# Patient Record
Sex: FEMALE | Race: WHITE | NOT HISPANIC OR LATINO
[De-identification: names, ages, dates, MRNs, and addresses within clinical notes are randomized per-mention and may not be internally consistent; named-entity substitution may affect disease eponyms.]

---

## 2022-09-29 ENCOUNTER — APPOINTMENT (OUTPATIENT)
Dept: ORTHOPEDIC SURGERY | Facility: CLINIC | Age: 62
End: 2022-09-29

## 2022-11-30 ENCOUNTER — APPOINTMENT (OUTPATIENT)
Dept: ULTRASOUND IMAGING | Facility: CLINIC | Age: 62
End: 2022-11-30

## 2022-11-30 ENCOUNTER — OUTPATIENT (OUTPATIENT)
Dept: OUTPATIENT SERVICES | Facility: HOSPITAL | Age: 62
LOS: 1 days | End: 2022-11-30

## 2022-11-30 PROCEDURE — 76881 US COMPL JOINT R-T W/IMG: CPT | Mod: 26,RT

## 2022-12-14 ENCOUNTER — TRANSCRIPTION ENCOUNTER (OUTPATIENT)
Age: 62
End: 2022-12-14

## 2023-05-18 ENCOUNTER — APPOINTMENT (OUTPATIENT)
Dept: ORTHOPEDIC SURGERY | Facility: CLINIC | Age: 63
End: 2023-05-18
Payer: MEDICAID

## 2023-05-18 ENCOUNTER — NON-APPOINTMENT (OUTPATIENT)
Age: 63
End: 2023-05-18

## 2023-05-18 DIAGNOSIS — M54.2 CERVICALGIA: ICD-10-CM

## 2023-05-18 PROCEDURE — 99204 OFFICE O/P NEW MOD 45 MIN: CPT

## 2023-05-20 PROBLEM — M54.2 NECK PAIN: Status: ACTIVE | Noted: 2023-05-18

## 2023-05-20 NOTE — PHYSICAL EXAM
[de-identified] : General: No acute distress, conversant, well-nourished.\par Head: Normocephalic, atraumatic\par Neck: trachea midline, FROM\par Heart: normotensive and normal rate and rhythm\par Lungs: No labored breathing\par Skin: No abrasions, no rashes, no edema\par Psych: Alert and oriented to person, place and time\par Extremities: no peripheral edema or digital cyanosis\par Gait: Normal gait. Can perform tandem gait.  \par Vascular: warm and well perfused distally, palpable distal pulses\par \par MSK:\par Spine: \par incisions well healed\par \par NEURO:\par Sensation \par          Left           \par C5     2/2               \par C6     2/2               \par C7     2/2               \par C8     2/2              \par T1     2/2             \par \par          Right         \par C5     2/2               \par C6     2/2               \par C7     2/2               \par C8     2/2              \par T1     2/2      \par \par Motor: \par                                                Left             \par C5 (deltoid abduction)             5/5               \par C6 (biceps flexion)                   5/5                \par C7 (triceps extension)             5/5               \par C8 (finger flexion)                     5/5               \par T1 (interosseous)                     5/5           \par \par                                                Right           \par C5 (deltoid abduction)             5/5               \par C6 (biceps flexion)                   5/5                \par C7 (triceps extension)             5/5               \par C8 (finger flexion)                     5/5               \par T1 (interosseous)                     5/5                     \par \par Sensation \par Left L2  -  2/2            \par Left L3  -  2/2\par Left L4  -  2/2\par Left L5  -  2/2\par Left S1  -  2/2\par \par Right L2  -  2/2            \par Right L3  -  2/2\par Right L4  -  2/2\par Right L5  -  2/2\par Right S1  -  2/2\par \par Motor: \par Left L2 (hip flexion)                            5/5                \par Left L3 (knee extension)                   5/5                \par Left L4 (ankle dorsiflexion)                 5/5                \par Left L5 (long toe extensor)                5/5                \par Left S1 (ankle plantar flexion)           5/5\par \par Right L2 (hip flexion)                            5/5                \par Right L3 (knee extension)                   5/5                \par Right L4 (ankle dorsiflexion)                 5/5                \par Right L5 (long toe extensor)                5/5                \par Right S1 (ankle plantar flexion)           5/5\par \par Reflexes: Normal and symmetric\par Negative Spurling’s test.  Negative Aden’s reflex.  \par Negative clonus.  Down-going Babinski. [de-identified] : Patient declined new radiographs\par She reports having recent imaging but does not have the images for review.\par She has incomplete printout of imaging reports.

## 2023-05-20 NOTE — ASSESSMENT
[FreeTextEntry1] : 62 year old female presents with acute exacerbation of chronic neck pain.  She has had multiple cervical surgeries.  Her first surgery was a posterior cervical decompression and fusion in 2020.  She required two revision surgeries in 2022 for possible hardware failure and nonunion. She smoke tobacco.  She was told she needs another revision.  She is here for a second opinion. She will followup with her recent images on CD and recent radiology reports. She was counseled on tobacco smoking cessation. She will be sent for a DEXA scan. We discussed red flag symptoms that would require emergent evaluation. She knows to call with any questions or concerns or if her symptoms acutely worsen.

## 2023-05-20 NOTE — HISTORY OF PRESENT ILLNESS
[de-identified] : 62 year old female presents with acute exacerbation of chronic neck pain.  She has had multiple cervical surgeries.  Her first surgery was a posterior cervical decompression and fusion in 2020.  She required two revision surgeries in 2022 for possible hardware failure and nonunion. She smoke tobacco.  She was told she needs another revision.  She is here for a second opinion. She does not have her recent imaging. She reports continued neck pain.

## 2024-04-24 ENCOUNTER — APPOINTMENT (OUTPATIENT)
Dept: ORTHOPEDIC SURGERY | Facility: CLINIC | Age: 64
End: 2024-04-24
Payer: MEDICAID

## 2024-04-24 VITALS — HEIGHT: 61 IN | WEIGHT: 99 LBS | BODY MASS INDEX: 18.69 KG/M2

## 2024-04-24 PROCEDURE — 99204 OFFICE O/P NEW MOD 45 MIN: CPT

## 2024-04-24 NOTE — PHYSICAL EXAM
[de-identified] : PHYSICAL EXAM  RIGHT  SHOULDER   NORMAL POSTURE / SCAPULAR PROTRACTION AROM 140 / 140 / 90 / 30 TENDER: SA REGION LATERAL  SPECIAL TESTING : ARGUETA - POSITIVE  NIK - POSITIVE  SPEED TEST - POSITIVE  FALLON - NEGATIVE  APPREHENSION AND SUPPRESSION - NEGATIVE   RC STRENGTH TESTING  SS:  4/5 SUB 5/5 IS     5/5 BICEPS  5/5  SENSATION  - GROSSLY INTACT    [de-identified] : Date of Exam: 03-   EXAM:  MRI RIGHT SHOULDER WITHOUT CONTRAST   IMPRESSION:  MRI of the right shoulder demonstrates:  1.  Severe supraspinatus and infraspinatus tendinosis with superimposed complex high-grade to full-thickness tearing at the junctional fibers with delamination continuation into the myotendinous junctions as detailed above. 2.  Mild subscapularis tendinosis with low-grade interstitial and articular surface fraying at the footprint. 3.  No significant fatty muscle atrophy or muscle edema. 4.  Glenohumeral joint osteoarthrosis with degenerative tearing of the superior labrum. 5.  Small to moderate joint effusion with synovial hypertrophy/debris. 6.  Moderate to severe acromioclavicular joint osteoarthrosis.

## 2024-04-24 NOTE — HISTORY OF PRESENT ILLNESS
[de-identified] : LOCATION: RIGHT SHOULDE RPAIN- RHD DURATION: 2020- PATIENT FELL BACKWARDS- SHOULDER PAIN AFTER FRACTURE OF NECK  ( TREATED WITH FUSION ) RADIATING PAIN UP NECK AND DOWN ARM  CONSTANT PAIN LEVEL:10/10  APRIL 18, 2024- PT HAD 3 CORTISONE INJ-STATED LAST INJ WAS APRIL 18 TREATMENTS: REST, TRAMDAOL, MUSCLE RELAXER  AGGRAVATING FACTORS: OVER HEAD LIFTING, REACHING BEHIND  PT WENT TO PHYSCIAL THERAPY 20 SEESIONS  PRIOR STUDIES:

## 2024-04-24 NOTE — DISCUSSION/SUMMARY
[de-identified] : PATIENT HAS COMPLETENEAR COMPLETE 15 MM ROTATOR CUFF TEAR ON MRI HAS HAD MULTIPLE CORTICOSTEROID INJECTIONS WITHOUT RELIEF HAS HAD EXTENDED PHYSICAL THERAPY MORE THAN 20 SESSIONS WITHOUT RELIEF.  I HAVE OFFERED PATIENT ARTHROSCOPIC TREATMENT ROTATOR CUFF REPAIR ACROMION PLASTY DECOMPRESSION.  SHE UNDERSTANDS 4 TO 6 WEEKS 6 WEEKS IMMOBILIZATION SLING 12 TO 24 WEEKS PHYSICAL THERAPY  ON AVERAGE 80 TO 85% IMPROVEMENT BY 1 YEAR % OUTCOMES PROMISED   PREOP SHOULDER SURGERY DISCUSSION:  PROCEDURE DISCUSSED - QUESTIONS ANSWERED PATIENT WISHES TO PROCEED  POST OP CARE AND LIMITATIONS REVIEWED - HANDOUT PROVIDED   COLD PACKS RECOMMENDED ANALGESICS AND  ANTI NAUSEA MEDS PRESCRIBED  COLACE RECOMMENDED   THERE ARE NO GUARANTEES THAT ALL SYMPTOMS WILL BE ALLEVIATED   SHOULDER ARTHROSCOPY, ACROMIOPLASTY, DEBRIDEMENT,  RC REPAIR AND LABRUM REPAIRS- ON AVERAGE 75- 85% SATISFACTORY RESULTS FOR TEARS < 3CM AFTER 9-12 MONTHS HEALING AND REHABILITATION.   REPAIRS WILL REQUIRE STRICT SHOULDER IMMOBILIZER 4-6 WEEKS  RC TEARS 3CM OR LARGER MAY REQUIRE COLLAGEN PATCH AUGMENTATION GENERALLY HAVE LESS SATISFACTORY RESULTS  PHYSICAL THERAPY REQUIRED 2X WEEK FOR  MINIMUM 8-12 WEEKS FOR ALL PROCEDURES  CONTINUED HOME EXERCISES 6-9 MONTHS AFTER THAT REQUIRED FOR OPTIMAL OUTCOMES   ROUTINE SURGICAL AND ANESTHETIC RISKS INCLUDE RISK OF SURGICAL INFECTION, ANESTHETIC COMPLICATION OR ALLERGY, POSSIBLE RETEARS OR PROGRESSION OF TEAR, STIFFNESS OF SHOULDER AND UNSATISFACTORY OUTCOMES  PATIENT UNDERSTANDS AND WISHES TO PROCEED

## 2024-05-02 ENCOUNTER — APPOINTMENT (OUTPATIENT)
Dept: INTERNAL MEDICINE | Facility: CLINIC | Age: 64
End: 2024-05-02

## 2024-05-07 ENCOUNTER — APPOINTMENT (OUTPATIENT)
Dept: INTERNAL MEDICINE | Facility: CLINIC | Age: 64
End: 2024-05-07
Payer: MEDICAID

## 2024-05-07 VITALS
HEART RATE: 101 BPM | SYSTOLIC BLOOD PRESSURE: 119 MMHG | HEIGHT: 61 IN | WEIGHT: 100 LBS | BODY MASS INDEX: 18.88 KG/M2 | TEMPERATURE: 98.4 F | DIASTOLIC BLOOD PRESSURE: 84 MMHG | OXYGEN SATURATION: 98 %

## 2024-05-07 DIAGNOSIS — Z82.0 FAMILY HISTORY OF EPILEPSY AND OTHER DISEASES OF THE NERVOUS SYSTEM: ICD-10-CM

## 2024-05-07 DIAGNOSIS — F17.210 NICOTINE DEPENDENCE, CIGARETTES, UNCOMPLICATED: ICD-10-CM

## 2024-05-07 DIAGNOSIS — Z82.5 FAMILY HISTORY OF ASTHMA AND OTHER CHRONIC LOWER RESPIRATORY DISEASES: ICD-10-CM

## 2024-05-07 DIAGNOSIS — Z00.00 ENCOUNTER FOR GENERAL ADULT MEDICAL EXAMINATION W/OUT ABNORMAL FINDINGS: ICD-10-CM

## 2024-05-07 DIAGNOSIS — J44.9 CHRONIC OBSTRUCTIVE PULMONARY DISEASE, UNSPECIFIED: ICD-10-CM

## 2024-05-07 DIAGNOSIS — Z01.818 ENCOUNTER FOR OTHER PREPROCEDURAL EXAMINATION: ICD-10-CM

## 2024-05-07 PROCEDURE — 93000 ELECTROCARDIOGRAM COMPLETE: CPT

## 2024-05-07 PROCEDURE — G2211 COMPLEX E/M VISIT ADD ON: CPT | Mod: NC,1L

## 2024-05-07 PROCEDURE — 99204 OFFICE O/P NEW MOD 45 MIN: CPT | Mod: GC,25

## 2024-05-07 RX ORDER — TIOTROPIUM BROMIDE INHALATION SPRAY 3.12 UG/1
2.5 SPRAY, METERED RESPIRATORY (INHALATION)
Refills: 0 | Status: ACTIVE | COMMUNITY

## 2024-05-07 RX ORDER — TIZANIDINE HYDROCHLORIDE 4 MG/1
4 CAPSULE ORAL
Refills: 0 | Status: ACTIVE | COMMUNITY

## 2024-05-07 RX ORDER — NICOTINE 21 MG/24HR
21 PATCH, TRANSDERMAL 24 HOURS TRANSDERMAL
Refills: 0 | Status: ACTIVE | COMMUNITY

## 2024-05-07 RX ORDER — TRAMADOL HYDROCHLORIDE 50 MG/1
50 TABLET, COATED ORAL
Refills: 0 | Status: ACTIVE | COMMUNITY

## 2024-05-07 RX ORDER — NICOTINE 21 MG/24HR
21 PATCH, TRANSDERMAL 24 HOURS TRANSDERMAL DAILY
Qty: 30 | Refills: 3 | Status: ACTIVE | COMMUNITY
Start: 2024-05-07 | End: 1900-01-01

## 2024-05-07 RX ORDER — PAROXETINE HYDROCHLORIDE 40 MG/1
40 TABLET, FILM COATED ORAL
Refills: 0 | Status: ACTIVE | COMMUNITY

## 2024-05-07 RX ORDER — OMEPRAZOLE MAGNESIUM 20 MG/1
20 TABLET, DELAYED RELEASE ORAL
Refills: 0 | Status: ACTIVE | COMMUNITY

## 2024-05-07 RX ORDER — FLUTICASONE PROPIONATE 50 UG/1
50 SPRAY, METERED NASAL
Refills: 0 | Status: ACTIVE | COMMUNITY

## 2024-05-07 RX ORDER — CLONAZEPAM 0.5 MG/1
0.5 TABLET ORAL
Refills: 0 | Status: ACTIVE | COMMUNITY

## 2024-05-07 NOTE — HISTORY OF PRESENT ILLNESS
[No Pertinent Cardiac History] : no history of aortic stenosis, atrial fibrillation, coronary artery disease, recent myocardial infarction, or implantable device/pacemaker [Smoker] : smoker [No Adverse Anesthesia Reaction] : no adverse anesthesia reaction in self or family member [(Patient denies any chest pain, claudication, dyspnea on exertion, orthopnea, palpitations or syncope)] : Patient denies any chest pain, claudication, dyspnea on exertion, orthopnea, palpitations or syncope [Good (7-10 METs)] : Good (7-10 METs) [Coronary Artery Disease] : no coronary artery disease [Recent Myocardial Infarction] : no recent myocardial infarction [Family Member] : no family member with adverse anesthesia reaction/sudden death [Self] : no previous adverse anesthesia reaction [Chronic Anticoagulation] : no chronic anticoagulation [Chronic Kidney Disease] : no chronic kidney disease [Diabetes] : no diabetes [FreeTextEntry2] : 5/14/24 [FreeTextEntry4] : 63F PMHx anxiety/depression, HLD, ?emphysema, osteoporosis, chronic neck pain (s/p fracture 2020, s/p 3 cervical fusion procedures), and recent dx of Right rotator cuff tear, presenting today for pre-op eval for upcoming arthroscopic rotator cuff repair with Dr Thomas Whitehead. Patient was seen 4/24 by Ortho after R shoulder MRI found severe supraspinatus and infraspinatus tendinosis with superimposed complex high-grade to full-thickness tear of R rotator cuff, and offered surgical repair after R shoulder pain failed to improve with oral pain regimen, PT, and steroid injections. Planned for 5/14/24. See pre-op assessment below.

## 2024-05-07 NOTE — PLAN
[FreeTextEntry1] : .   #pre-operative evaluation  #arthroscopic rotator cuff repair - Planned 5/14/24 with Dr Thomas Encinas Re - PMHx, PSHx, and current medications reviewed with patient - No cardiac history; no exertional CP, YANG - EKG performed today; normal sinus rhythm - Reports possible h/o emphysema iso 30+ yrs smoking history; currently using Spiriva daily without respiratory sxs - No h/o anesthesia reaction; tolerated well in the past, and no h/o bleeding disorders; not currently on AC - No h/o renal, hepatic disease  - Current daily smoker but motivated to quit; daily MJ smoker; minimal alcohol use - Excellent ET; METs >4 - RCRI, Ortega risk assessments pending renal function - f/u CBC, CMP - okay to continue all home medications up until day of procedure   #active smoker - encouraged patient to quit smoking as soon as possible prior to surgery to reduce post-op complications and improved recovery - sent Rx nicotine patch 21mg  #HCM - abnormal DEXA scan (+osteoporosis) ~4 months ago; asked patient to bring report at f/u  - address remainder of cancer screening at f/u visit  - f/u TSH, lipid panel   RTC 3 months for CPE

## 2024-05-07 NOTE — PHYSICAL EXAM
[No Acute Distress] : no acute distress [Normal Sclera/Conjunctiva] : normal sclera/conjunctiva [PERRL] : pupils equal round and reactive to light [EOMI] : extraocular movements intact [Normal Oropharynx] : the oropharynx was normal [Thyroid Normal, No Nodules] : the thyroid was normal and there were no nodules present [No Respiratory Distress] : no respiratory distress  [No Accessory Muscle Use] : no accessory muscle use [Regular Rhythm] : with a regular rhythm [Normal S1, S2] : normal S1 and S2 [No Murmur] : no murmur heard [No Carotid Bruits] : no carotid bruits [No Edema] : there was no peripheral edema [Soft] : abdomen soft [Non Tender] : non-tender [No HSM] : no HSM [No Spinal Tenderness] : no spinal tenderness [Kyphosis] : kyphosis [No Rash] : no rash [No Focal Deficits] : no focal deficits [Normal Insight/Judgement] : insight and judgment were intact [de-identified] : thin [de-identified] : enlarged b/l tonsils [de-identified] : prominent C7 process

## 2024-05-07 NOTE — REVIEW OF SYSTEMS
[Hearing Loss] : hearing loss [Joint Pain] : joint pain [Fever] : no fever [Chills] : no chills [Fatigue] : no fatigue [Discharge] : no discharge [Pain] : no pain [Vision Problems] : no vision problems [Earache] : no earache [Nosebleed] : no nosebleeds [Sore Throat] : no sore throat [Chest Pain] : no chest pain [Lower Ext Edema] : no lower extremity edema [Orthopnea] : no orthopnea [Shortness Of Breath] : no shortness of breath [Wheezing] : no wheezing [Cough] : no cough [Dyspnea on Exertion] : no dyspnea on exertion [Abdominal Pain] : no abdominal pain [Nausea] : no nausea [Vomiting] : no vomiting [Dysuria] : no dysuria [Incontinence] : no incontinence [Hematuria] : no hematuria [Muscle Pain] : no muscle pain [Itching] : no itching [Skin Rash] : no skin rash [Headache] : no headache [Dizziness] : no dizziness [Fainting] : no fainting [Memory Loss] : no memory loss [Insomnia] : no insomnia [Anxiety] : no anxiety [Depression] : no depression [Easy Bleeding] : no easy bleeding [Easy Bruising] : no easy bruising [FreeTextEntry4] : hard of hearing, chronic sinus congestion

## 2024-05-07 NOTE — END OF VISIT
[] : Resident [FreeTextEntry3] : Preop assessment for rotator cuff tear. Pending labs, patient appears to optimized for surgery but we will need to see the lab results to be sure. EKG is normal. She is also here to establish care for COPD and health maintenance. She appears to have good exercise tolerance so we will continue Spiriva and check lipids. Will follow up for other problems including osteoporosis after rehab. She had a Dexa a few months ago elsewhere and says it showed osteoporosis. She will bring in the report next visit.

## 2024-05-07 NOTE — ASSESSMENT
[FreeTextEntry4] : 63F PMHx anxiety/depression, HLD, ?emphysema, osteoporosis, chronic neck pain (s/p fracture 2020, s/p 3 cervical fusion procedures), and recent dx of Right rotator cuff tear, presenting today for pre-op eval for upcoming arthroscopic rotator cuff repair.

## 2024-05-08 ENCOUNTER — APPOINTMENT (OUTPATIENT)
Dept: ORTHOPEDIC SURGERY | Facility: CLINIC | Age: 64
End: 2024-05-08

## 2024-05-08 ENCOUNTER — NON-APPOINTMENT (OUTPATIENT)
Age: 64
End: 2024-05-08

## 2024-05-08 LAB
ALBUMIN SERPL ELPH-MCNC: 4.5 G/DL
ALP BLD-CCNC: 84 U/L
ALT SERPL-CCNC: 26 U/L
ANION GAP SERPL CALC-SCNC: 11 MMOL/L
AST SERPL-CCNC: 32 U/L
BILIRUB SERPL-MCNC: 0.2 MG/DL
BUN SERPL-MCNC: 16 MG/DL
CALCIUM SERPL-MCNC: 9.6 MG/DL
CHLORIDE SERPL-SCNC: 103 MMOL/L
CHOLEST SERPL-MCNC: 197 MG/DL
CO2 SERPL-SCNC: 28 MMOL/L
CREAT SERPL-MCNC: 0.65 MG/DL
EGFR: 100 ML/MIN/1.73M2
GLUCOSE SERPL-MCNC: 73 MG/DL
HCT VFR BLD CALC: 44.7 %
HDLC SERPL-MCNC: 66 MG/DL
HGB BLD-MCNC: 14.7 G/DL
LDLC SERPL CALC-MCNC: 114 MG/DL
MCHC RBC-ENTMCNC: 31.8 PG
MCHC RBC-ENTMCNC: 32.9 GM/DL
MCV RBC AUTO: 96.8 FL
NONHDLC SERPL-MCNC: 131 MG/DL
PLATELET # BLD AUTO: 160 K/UL
POTASSIUM SERPL-SCNC: 4.4 MMOL/L
PROT SERPL-MCNC: 6.6 G/DL
RBC # BLD: 4.62 M/UL
RBC # FLD: 13.8 %
SODIUM SERPL-SCNC: 142 MMOL/L
TRIGL SERPL-MCNC: 95 MG/DL
TSH SERPL-ACNC: 1 UIU/ML
WBC # FLD AUTO: 6.86 K/UL

## 2024-05-14 ENCOUNTER — APPOINTMENT (OUTPATIENT)
Dept: INTERNAL MEDICINE | Facility: CLINIC | Age: 64
End: 2024-05-14

## 2024-05-14 ENCOUNTER — APPOINTMENT (OUTPATIENT)
Age: 64
End: 2024-05-14
Payer: MEDICAID

## 2024-05-14 PROCEDURE — 29826 SHO ARTHRS SRG DECOMPRESSION: CPT | Mod: RT,59

## 2024-05-14 PROCEDURE — 29820 SHO ARTHRS SRG PRTL SYNVCT: CPT | Mod: RT,59

## 2024-05-14 PROCEDURE — 29825 SHO ARTHRS SRG LSS&RESCJ ADS: CPT | Mod: RT,59

## 2024-05-14 PROCEDURE — 29827 SHO ARTHRS SRG RT8TR CUF RPR: CPT | Mod: RT

## 2024-05-14 PROCEDURE — 29823 SHO ARTHRS SRG XTNSV DBRDMT: CPT | Mod: RT,59

## 2024-05-14 RX ORDER — ONDANSETRON 8 MG/1
8 TABLET, ORALLY DISINTEGRATING ORAL 3 TIMES DAILY
Qty: 15 | Refills: 4 | Status: ACTIVE | COMMUNITY
Start: 2024-05-14 | End: 1900-01-01

## 2024-05-17 ENCOUNTER — APPOINTMENT (OUTPATIENT)
Dept: ORTHOPEDIC SURGERY | Facility: CLINIC | Age: 64
End: 2024-05-17
Payer: MEDICAID

## 2024-05-17 PROCEDURE — 73030 X-RAY EXAM OF SHOULDER: CPT | Mod: RT

## 2024-05-17 RX ORDER — OXYCODONE AND ACETAMINOPHEN 7.5; 325 MG/1; MG/1
7.5-325 TABLET ORAL
Qty: 28 | Refills: 0 | Status: ACTIVE | COMMUNITY
Start: 2024-05-14 | End: 1900-01-01

## 2024-05-17 NOTE — HISTORY OF PRESENT ILLNESS
[de-identified] : RIGHT SHOULDER PAIN  FOLLOW UP  POST 3 DAYS  PAIN LEVEL: 4/10  MAY 14, 2024- RIGHT 20MM ROT CUFF REPAIR, FATIMAH, DEBRIDEMENT     PREVIOUS HPI LOCATION: RIGHT SHOULDE RPAIN- RHD DURATION: 2020- PATIENT FELL BACKWARDS- SHOULDER PAIN AFTER FRACTURE OF NECK  ( TREATED WITH FUSION ) RADIATING PAIN UP NECK AND DOWN ARM  CONSTANT PAIN LEVEL:10/10  APRIL 18, 2024- PT HAD 3 CORTISONE INJ-STATED LAST INJ WAS APRIL 18 TREATMENTS: REST, TRAMDAOL, MUSCLE RELAXER  AGGRAVATING FACTORS: OVER HEAD LIFTING, REACHING BEHIND  PT WENT TO PHYSCIAL THERAPY 20 SEESIONS  PRIOR STUDIES:

## 2024-05-17 NOTE — PHYSICAL EXAM
[de-identified] : POST OP SHOULDER EXAM   PORTALS HEALING WELL - NO ERYTHEMA OR CALOR SUTURES REMOVED, STERISTRIPS AND WATERPROOF BANDAIDS  APPLIED   AROM  NT  DISTAL CMS INTACT [de-identified] : RIGHT SHOULDER XRAY (2 VIEWS - AP AND OUTLET)  -   NO OBVIOUS FRACTURE OR DISLOCATION,  SATISFACTORY DECOMPRESSION NOTED  ,  ANCHOR SILHOUETTE VISIBLE IN GREATER TUBEROSITY- SATISFACTORY POSITION

## 2024-05-17 NOTE — DISCUSSION/SUMMARY
[de-identified] : POST OP REPAIR:  COLD THERAPY,ANALGESICS AS NEEDED  SHOULDER IMMOBILIZED FULL TIME X 3 - 6 WEEKS - STRICT NO AROM - DESKTOP WORK ALLOWED  SCAPULAR SQUEEZES / ROLLS, ELBOW, WRIST, HAND AROM TID   START PENDULUM EXERCISES, EXT ROT  3 WEEK POSTOP  START AAROM FLEXION, RESHMA  5 WEEKS POST OP  6 WEEKS POSTOP  - ADVANCE TO P.T.

## 2024-06-07 ENCOUNTER — APPOINTMENT (OUTPATIENT)
Dept: ORTHOPEDIC SURGERY | Facility: CLINIC | Age: 64
End: 2024-06-07
Payer: MEDICAID

## 2024-06-07 DIAGNOSIS — M75.121 COMPLETE ROTATOR CUFF TEAR OR RUPTURE OF RIGHT SHOULDER, NOT SPECIFIED AS TRAUMATIC: ICD-10-CM

## 2024-06-07 PROCEDURE — 99024 POSTOP FOLLOW-UP VISIT: CPT

## 2024-06-07 NOTE — HISTORY OF PRESENT ILLNESS
[de-identified] : RIGHT SHOULDER PAIN  FOLLOW UP  POST 3 weeks PAIN LEVEL: 4/10  MAY 14, 2024- RIGHT 20MM ROT CUFF REPAIR, FATIMAH, DEBRIDEMENT     PREVIOUS HPI LOCATION: RIGHT SHOULDE RPAIN- RHD DURATION: 2020- PATIENT FELL BACKWARDS- SHOULDER PAIN AFTER FRACTURE OF NECK  ( TREATED WITH FUSION ) RADIATING PAIN UP NECK AND DOWN ARM  CONSTANT PAIN LEVEL:10/10  APRIL 18, 2024- PT HAD 3 CORTISONE INJ-STATED LAST INJ WAS APRIL 18 TREATMENTS: REST, TRAMDAOL, MUSCLE RELAXER  AGGRAVATING FACTORS: OVER HEAD LIFTING, REACHING BEHIND  PT WENT TO PHYSCIAL THERAPY 20 SEESIONS  PRIOR STUDIES:

## 2024-06-07 NOTE — DISCUSSION/SUMMARY
[de-identified] : POST OP REPAIR:  COLD THERAPY,ANALGESICS AS NEEDED  SHOULDER IMMOBILIZED FULL TIME X 3 - 6 WEEKS - STRICT NO AROM - DESKTOP WORK ALLOWED  SCAPULAR SQUEEZES / ROLLS, ELBOW, WRIST, HAND AROM TID   START PENDULUM EXERCISES, EXT ROT  3 WEEK POSTOP  START AAROM FLEXION, RESHMA  5 WEEKS POST OP  6 WEEKS POSTOP  - ADVANCE TO P.T.

## 2024-06-07 NOTE — PHYSICAL EXAM
[de-identified] : POST OP SHOULDER EXAM  MAY 14, 2024- RIGHT 20MM ROT CUFF REPAIR, FATIMAH, DEBRIDEMENT  PORTALS HEALING WELL - NO ERYTHEMA OR CALOR  AROM  PENDULUM   DISTAL CMS INTACT

## 2024-08-05 ENCOUNTER — APPOINTMENT (OUTPATIENT)
Dept: ORTHOPEDIC SURGERY | Facility: CLINIC | Age: 64
End: 2024-08-05

## 2024-08-05 PROCEDURE — 99024 POSTOP FOLLOW-UP VISIT: CPT

## 2024-08-05 NOTE — PHYSICAL EXAM
[de-identified] : POST OP SHOULDER EXAM  MAY 14, 2024- RIGHT 20MM ROT CUFF REPAIR, FATIMAH, DEBRIDEMENT  PORTALS HEALING WELL - NO ERYTHEMA OR CALOR  AAROM  140 /130 / 80 / 15   DISTAL CMS INTACT

## 2024-08-05 NOTE — DISCUSSION/SUMMARY
[de-identified] : HAS NOT STARTED PT YET  NEW PT REFERRAL PROVIDED - STRONGLY ENCOURAGED   ADDITIOAL HOME EXERCISES PROVIDED - MOON SHOULDER GROUP   FU 8 WEEKS

## 2024-08-05 NOTE — HISTORY OF PRESENT ILLNESS
[de-identified] : RIGHT SHOULDER POST OP PAIN LEVEL: 5/10 POST OP 3 MONTHS MAY 14, 2024- RIGHT 20MM ROT CUFF REPAIR, FATIMAH, DEBRIDEMENT FEELING A LITTLE BIT LIKE SHE DID BEFORE BUT OTHERWISE DOING WELL HAS NOT STARTED PHYSICAL THERAPY  PREVIOUS HPI RIGHT SHOULDER PAIN FOLLOW UP POST 3 weeks PAIN LEVEL: 4/10 MAY 14, 2024- RIGHT 20MM ROT CUFF REPAIR, FATIMAH, DEBRIDEMENT

## 2024-08-14 ENCOUNTER — APPOINTMENT (OUTPATIENT)
Dept: ENDOCRINOLOGY | Facility: CLINIC | Age: 64
End: 2024-08-14
Payer: MEDICAID

## 2024-08-14 VITALS
BODY MASS INDEX: 19.84 KG/M2 | HEART RATE: 93 BPM | SYSTOLIC BLOOD PRESSURE: 143 MMHG | WEIGHT: 105 LBS | DIASTOLIC BLOOD PRESSURE: 90 MMHG

## 2024-08-14 DIAGNOSIS — M81.0 AGE-RELATED OSTEOPOROSIS W/OUT CURRENT PATHOLOGICAL FRACTURE: ICD-10-CM

## 2024-08-14 PROCEDURE — 99205 OFFICE O/P NEW HI 60 MIN: CPT | Mod: 25

## 2024-08-15 PROBLEM — M81.0 OSTEOPOROSIS, POSTMENOPAUSAL: Status: ACTIVE | Noted: 2024-08-14

## 2024-08-15 LAB
25(OH)D3 SERPL-MCNC: 26.3 NG/ML
ALBUMIN SERPL ELPH-MCNC: 4.7 G/DL
ALP BLD-CCNC: 100 U/L
ALT SERPL-CCNC: 25 U/L
ANION GAP SERPL CALC-SCNC: 11 MMOL/L
AST SERPL-CCNC: 29 U/L
BILIRUB SERPL-MCNC: 0.2 MG/DL
BUN SERPL-MCNC: 16 MG/DL
CALCIUM SERPL-MCNC: 9.7 MG/DL
CALCIUM SERPL-MCNC: 9.7 MG/DL
CHLORIDE SERPL-SCNC: 108 MMOL/L
CO2 SERPL-SCNC: 26 MMOL/L
CREAT SERPL-MCNC: 0.67 MG/DL
EGFR: 99 ML/MIN/1.73M2
GLUCOSE SERPL-MCNC: 100 MG/DL
HCT VFR BLD CALC: 43.5 %
HGB BLD-MCNC: 14.2 G/DL
MCHC RBC-ENTMCNC: 31.8 PG
MCHC RBC-ENTMCNC: 32.6 GM/DL
MCV RBC AUTO: 97.5 FL
PARATHYROID HORMONE INTACT: 26 PG/ML
PLATELET # BLD AUTO: 192 K/UL
POTASSIUM SERPL-SCNC: 5.5 MMOL/L
PROT SERPL-MCNC: 6.6 G/DL
RBC # BLD: 4.46 M/UL
RBC # FLD: 14.5 %
SODIUM SERPL-SCNC: 144 MMOL/L
TSH SERPL-ACNC: 1.12 UIU/ML
WBC # FLD AUTO: 5.73 K/UL

## 2024-08-15 RX ORDER — CALCIUM CARBONATE/VITAMIN D3 600MG-5MCG
600-10 TABLET ORAL
Qty: 90 | Refills: 1 | Status: ACTIVE | COMMUNITY
Start: 2024-08-15 | End: 1900-01-01

## 2024-08-15 NOTE — RESULTS/DATA
[Hologic] : hologic [L1 - L4] : L1 - L4 [BMD ___ g/cm2] : BMD: [unfilled] g/cm2 [T-Score ___] : T-score: [unfilled] [FreeTextEntry4] : 12/22/23 [FreeTextEntry7] : Right [FreeTextEntry8] : Right [FreeTextEntry9] : Left [de-identified] : Osteoporosis

## 2024-08-15 NOTE — CONSULT LETTER
[Dear  ___] : Dear  [unfilled], [Consult Letter:] : I had the pleasure of evaluating your patient, [unfilled]. [Sincerely,] : Sincerely, [FreeTextEntry3] : Alin Zamudio

## 2024-08-15 NOTE — HISTORY OF PRESENT ILLNESS
[FreeTextEntry1] : 63 year old F pt, with Hx of Osteoporosis (dx. in 2020), referred by Thomas Whitehead MD, FAX [phone], presents today to establish endocrine care. Other PMHx: neck fracture 10/2020, No PMHx of: GERD PSHx: Appendectomy 2023; 3 cervical fusions 10/2020, 04/2022, 01/2023; rotator cuff surgery 05/14/24 FHx: Mother: osteoporosis, Father: prostate CA, Alzheimer's Disease, No FHx of: hip fracture, thyroid disorder SHx: Daily smoker, occasional EtOH, daily coffee.  NKDA LMP age late 40s.  3 kids, youngest 24.   08/14/2024   Chart review 05/07/24 metabolic and kidney profile are normal, TSH normal,  intake form CC: "I broke my neck in 2020 and tried cervical fusion, but I was pain and they did a revision. Afterward, I had screws lose and I'm scheduled for another revision. I recently underwent 05/13/24 right rotator cuff surgery." Pt brought a bone density. - 12/22/23 BMD: AP(L1-L4) T-score -2.7, femoral neck (right) -2.2, total hip (right) -2.3, 1/3 forearm left -2.5, L3 -3.1.  Pt is scheduled for surgery in ~6 months due to loose screws in her cervical spine. She was recommended to rebuild her bones prior to surgery. Pt recently followed up with her dentist and is currently not scheduled for any procedures. Pt has underwent physical therapy 3 times.  Pt endorses some balance challenges,  Pt denies chronic diarrheas, height loss  05/2024 Medical review: Hx of anxiety/depression, HLD, emphysema, osteoporosis, chronic neck pain, S/p fracture 2020, 3 cervical fusion procedures, pt was on steroid injections.   Current Medications: Naproxen 400 mg bid, Flexeril, Klonopin 0.5 mg qd, Paroxetine 40 mg, Hydrocodone 0.325 mg bid, multivitamin, muscle relaxer 10 mg bid Medication modified/added this visit: Vitamin D and calcium.

## 2024-08-15 NOTE — PHYSICAL EXAM
[No Acute Distress] : no acute distress [Normal Sclera/Conjunctiva] : normal sclera/conjunctiva [No Proptosis] : no proptosis [Normal Outer Ear/Nose] : the ears and nose were normal in appearance [Normal Oropharynx] : the oropharynx was normal [Thyroid Not Enlarged] : the thyroid was not enlarged [No Thyroid Nodules] : no palpable thyroid nodules [Clear to Auscultation] : lungs were clear to auscultation bilaterally [Normal Rate] : heart rate was normal [No Edema] : no peripheral edema [Soft] : abdomen soft [No Stigmata of Cushings Syndrome] : no stigmata of Cushings Syndrome [Normal Gait] : normal gait [Normal Strength/Tone] : muscle strength and tone were normal [No Rash] : no rash [Normal Reflexes] : deep tendon reflexes were 2+ and symmetric [No Tremors] : no tremors [Oriented x3] : oriented to person, place, and time [Kyphosis] : kyphosis present [Scoliosis] : scoliosis present [Acanthosis Nigricans] : no acanthosis nigricans [de-identified] : Long surgical scar, Kyphosis, mild scoliosis,

## 2024-08-15 NOTE — ADDENDUM
[FreeTextEntry1] : I, Tristan You act solely as a scribe for Dr. Alin Zamudio on this date 08/14/2024

## 2024-08-15 NOTE — END OF VISIT
[Time Spent: ___ minutes] : I have spent [unfilled] minutes of time on the encounter. [FreeTextEntry3] :  All medical record entries made by the Scribe were at my, Dr. Alin Zamudio, direction and personally dictated by me on 08/14/2024. I have reviewed the chart and agree that the record accurately reflects my personal performance of the history, physical exam, assessment and plan. I have also personally directed, reviewed and agreed with the chart.

## 2024-08-15 NOTE — RESULTS/DATA
[Hologic] : hologic [L1 - L4] : L1 - L4 [BMD ___ g/cm2] : BMD: [unfilled] g/cm2 [T-Score ___] : T-score: [unfilled] [FreeTextEntry4] : 12/22/23 [FreeTextEntry7] : Right [FreeTextEntry8] : Right [FreeTextEntry9] : Left [de-identified] : Osteoporosis

## 2024-08-15 NOTE — PHYSICAL EXAM
[No Acute Distress] : no acute distress [Normal Sclera/Conjunctiva] : normal sclera/conjunctiva [No Proptosis] : no proptosis [Normal Outer Ear/Nose] : the ears and nose were normal in appearance [Normal Oropharynx] : the oropharynx was normal [Thyroid Not Enlarged] : the thyroid was not enlarged [No Thyroid Nodules] : no palpable thyroid nodules [Clear to Auscultation] : lungs were clear to auscultation bilaterally [Normal Rate] : heart rate was normal [No Edema] : no peripheral edema [Soft] : abdomen soft [No Stigmata of Cushings Syndrome] : no stigmata of Cushings Syndrome [Normal Gait] : normal gait [Normal Strength/Tone] : muscle strength and tone were normal [No Rash] : no rash [Normal Reflexes] : deep tendon reflexes were 2+ and symmetric [No Tremors] : no tremors [Oriented x3] : oriented to person, place, and time [Kyphosis] : kyphosis present [Scoliosis] : scoliosis present [Acanthosis Nigricans] : no acanthosis nigricans [de-identified] : Long surgical scar, Kyphosis, mild scoliosis,

## 2024-08-15 NOTE — HISTORY OF PRESENT ILLNESS
[FreeTextEntry1] : 63 year old F pt, with Hx of Osteoporosis (dx. in 2020), referred by Thomas Whitehead MD, FAX [phone], presents today to establish endocrine care. Other PMHx: neck fracture 10/2020, No PMHx of: GERD PSHx: Appendectomy 2023; 3 cervical fusions 10/2020, 04/2022, 01/2023; rotator cuff surgery 05/14/24 FHx: Mother: osteoporosis, Father: prostate CA, Alzheimer's Disease, No FHx of: hip fracture, thyroid disorder SHx: Daily smoker, occasional EtOH, daily coffee.  NKDA LMP age late 40s.  3 kids, youngest 24.   08/14/2024   Chart review 05/07/24 metabolic and kidney profile are normal, TSH normal,  intake form CC: "I broke my neck in 2020 and tried cervical fusion, but I was pain and they did a revision. Afterward, I had screws lose and I'm scheduled for another revision. I recently underwent 05/13/24 right rotator cuff surgery." Pt brought a bone density. - 12/22/23 BMD: AP(L1-L4) T-score -2.7, femoral neck (right) -2.2, total hip (right) -2.3, 1/3 forearm left -2.5, L3 -3.1.  Pt is scheduled for surgery in ~6 months due to loose screws in her cervical spine. She was recommended to rebuild her bones prior to surgery. Pt recently followed up with her dentist and is currently not scheduled for any procedures. Pt has underwent physical therapy 3 times.  Pt endorses some balance challenges,  Pt denies chronic diarrheas, height loss  05/2024 Medical review: Hx of anxiety/depression, HLD, emphysema, osteoporosis, chronic neck pain, S/p fracture 2020, 3 cervical fusion procedures, pt was on steroid injections.   Current Medications: Naproxen 400 mg bid, Flexeril, Klonopin 0.5 mg qd, Paroxetine 40 mg, Hydrocodone 0.325 mg bid, multivitamin, muscle relaxer 10 mg bid Medication modified/added this visit: Vitamin D and calcium.

## 2024-08-15 NOTE — REASON FOR VISIT
[Initial Evaluation] : an initial evaluation [Osteoporosis] : osteoporosis [FreeTextEntry2] : Thomas Whitehead MD, FAX [phone]

## 2024-08-15 NOTE — REASON FOR VISIT
[Initial Evaluation] : an initial evaluation [Osteoporosis] : osteoporosis [FreeTextEntry2] : Thomas Whitehead MD, FAX [phone]

## 2024-08-16 ENCOUNTER — APPOINTMENT (OUTPATIENT)
Dept: ENDOCRINOLOGY | Facility: CLINIC | Age: 64
End: 2024-08-16

## 2024-09-23 ENCOUNTER — MED ADMIN CHARGE (OUTPATIENT)
Age: 64
End: 2024-09-23

## 2024-09-23 ENCOUNTER — APPOINTMENT (OUTPATIENT)
Dept: ENDOCRINOLOGY | Facility: CLINIC | Age: 64
End: 2024-09-23

## 2024-09-23 PROCEDURE — 96372 THER/PROPH/DIAG INJ SC/IM: CPT

## 2025-02-03 ENCOUNTER — APPOINTMENT (OUTPATIENT)
Dept: ENDOCRINOLOGY | Facility: CLINIC | Age: 65
End: 2025-02-03
Payer: MEDICARE

## 2025-02-03 VITALS
WEIGHT: 104 LBS | HEART RATE: 112 BPM | DIASTOLIC BLOOD PRESSURE: 93 MMHG | SYSTOLIC BLOOD PRESSURE: 134 MMHG | BODY MASS INDEX: 19.65 KG/M2

## 2025-02-03 DIAGNOSIS — M81.0 AGE-RELATED OSTEOPOROSIS W/OUT CURRENT PATHOLOGICAL FRACTURE: ICD-10-CM

## 2025-02-03 PROCEDURE — 99203 OFFICE O/P NEW LOW 30 MIN: CPT

## 2025-03-03 ENCOUNTER — APPOINTMENT (OUTPATIENT)
Dept: INTERNAL MEDICINE | Facility: CLINIC | Age: 65
End: 2025-03-03
Payer: MEDICARE

## 2025-03-03 VITALS
SYSTOLIC BLOOD PRESSURE: 109 MMHG | HEART RATE: 94 BPM | WEIGHT: 104 LBS | DIASTOLIC BLOOD PRESSURE: 77 MMHG | OXYGEN SATURATION: 96 % | HEIGHT: 61 IN | BODY MASS INDEX: 19.63 KG/M2 | TEMPERATURE: 98.3 F

## 2025-03-03 DIAGNOSIS — H57.9 UNSPECIFIED DISORDER OF EYE AND ADNEXA: ICD-10-CM

## 2025-03-03 DIAGNOSIS — Z11.59 ENCOUNTER FOR SCREENING FOR OTHER VIRAL DISEASES: ICD-10-CM

## 2025-03-03 DIAGNOSIS — F33.0 MAJOR DEPRESSIVE DISORDER, RECURRENT, MILD: ICD-10-CM

## 2025-03-03 DIAGNOSIS — Z11.4 ENCOUNTER FOR SCREENING FOR HUMAN IMMUNODEFICIENCY VIRUS [HIV]: ICD-10-CM

## 2025-03-03 DIAGNOSIS — F17.210 NICOTINE DEPENDENCE, CIGARETTES, UNCOMPLICATED: ICD-10-CM

## 2025-03-03 DIAGNOSIS — F41.9 ANXIETY DISORDER, UNSPECIFIED: ICD-10-CM

## 2025-03-03 DIAGNOSIS — H02.9 UNSPECIFIED DISORDER OF EYELID: ICD-10-CM

## 2025-03-03 DIAGNOSIS — Z00.00 ENCOUNTER FOR GENERAL ADULT MEDICAL EXAMINATION W/OUT ABNORMAL FINDINGS: ICD-10-CM

## 2025-03-03 DIAGNOSIS — J44.9 CHRONIC OBSTRUCTIVE PULMONARY DISEASE, UNSPECIFIED: ICD-10-CM

## 2025-03-03 DIAGNOSIS — F17.200 NICOTINE DEPENDENCE, UNSPECIFIED, UNCOMPLICATED: ICD-10-CM

## 2025-03-03 PROCEDURE — G0439: CPT

## 2025-03-03 PROCEDURE — G0444 DEPRESSION SCREEN ANNUAL: CPT | Mod: 59

## 2025-03-03 PROCEDURE — 99204 OFFICE O/P NEW MOD 45 MIN: CPT | Mod: 25,GC

## 2025-03-03 RX ORDER — CYCLOBENZAPRINE HYDROCHLORIDE 10 MG/1
10 TABLET, FILM COATED ORAL 3 TIMES DAILY
Qty: 90 | Refills: 2 | Status: ACTIVE | COMMUNITY
Start: 2025-03-03

## 2025-03-04 ENCOUNTER — TRANSCRIPTION ENCOUNTER (OUTPATIENT)
Age: 65
End: 2025-03-04

## 2025-03-04 LAB
ALBUMIN SERPL ELPH-MCNC: 4.7 G/DL
ALP BLD-CCNC: 74 U/L
ALT SERPL-CCNC: 18 U/L
ANION GAP SERPL CALC-SCNC: 13 MMOL/L
AST SERPL-CCNC: 25 U/L
BASOPHILS # BLD AUTO: 0.05 K/UL
BASOPHILS NFR BLD AUTO: 0.7 %
BILIRUB SERPL-MCNC: 0.2 MG/DL
BUN SERPL-MCNC: 16 MG/DL
CALCIUM SERPL-MCNC: 10 MG/DL
CHLORIDE SERPL-SCNC: 104 MMOL/L
CHOLEST SERPL-MCNC: 210 MG/DL
CO2 SERPL-SCNC: 26 MMOL/L
CREAT SERPL-MCNC: 0.85 MG/DL
EGFRCR SERPLBLD CKD-EPI 2021: 77 ML/MIN/1.73M2
EOSINOPHIL # BLD AUTO: 0.26 K/UL
EOSINOPHIL NFR BLD AUTO: 3.5 %
ESTIMATED AVERAGE GLUCOSE: 103 MG/DL
GLUCOSE SERPL-MCNC: 97 MG/DL
HBA1C MFR BLD HPLC: 5.2 %
HCT VFR BLD CALC: 39.7 %
HDLC SERPL-MCNC: 50 MG/DL
HGB BLD-MCNC: 13.7 G/DL
IMM GRANULOCYTES NFR BLD AUTO: 0.1 %
LDLC SERPL CALC-MCNC: 124 MG/DL
LYMPHOCYTES # BLD AUTO: 2.76 K/UL
LYMPHOCYTES NFR BLD AUTO: 37.2 %
MAN DIFF?: NORMAL
MCHC RBC-ENTMCNC: 32.2 PG
MCHC RBC-ENTMCNC: 34.5 G/DL
MCV RBC AUTO: 93.2 FL
MONOCYTES # BLD AUTO: 0.56 K/UL
MONOCYTES NFR BLD AUTO: 7.6 %
NEUTROPHILS # BLD AUTO: 3.77 K/UL
NEUTROPHILS NFR BLD AUTO: 50.9 %
NONHDLC SERPL-MCNC: 160 MG/DL
PLATELET # BLD AUTO: 215 K/UL
POTASSIUM SERPL-SCNC: 4.4 MMOL/L
PROT SERPL-MCNC: 6.9 G/DL
RBC # BLD: 4.26 M/UL
RBC # FLD: 14.3 %
SODIUM SERPL-SCNC: 143 MMOL/L
TRIGL SERPL-MCNC: 205 MG/DL
WBC # FLD AUTO: 7.41 K/UL

## 2025-03-05 ENCOUNTER — TRANSCRIPTION ENCOUNTER (OUTPATIENT)
Age: 65
End: 2025-03-05

## 2025-03-05 ENCOUNTER — NON-APPOINTMENT (OUTPATIENT)
Age: 65
End: 2025-03-05

## 2025-03-05 ENCOUNTER — APPOINTMENT (OUTPATIENT)
Dept: ORTHOPEDIC SURGERY | Facility: CLINIC | Age: 65
End: 2025-03-05

## 2025-03-05 DIAGNOSIS — M75.121 COMPLETE ROTATOR CUFF TEAR OR RUPTURE OF RIGHT SHOULDER, NOT SPECIFIED AS TRAUMATIC: ICD-10-CM

## 2025-03-05 LAB
HCV AB SER QL: NONREACTIVE
HCV S/CO RATIO: 0.09 S/CO
HIV1+2 AB SPEC QL IA.RAPID: NONREACTIVE

## 2025-03-05 PROCEDURE — 20611 DRAIN/INJ JOINT/BURSA W/US: CPT | Mod: RT

## 2025-03-05 PROCEDURE — 99204 OFFICE O/P NEW MOD 45 MIN: CPT | Mod: 25

## 2025-03-06 RX ORDER — VARENICLINE TARTRATE 0.5 (11)-1
0.5 MG X 11 & KIT ORAL
Qty: 1 | Refills: 0 | Status: ACTIVE | COMMUNITY
Start: 2025-03-03 | End: 1900-01-01

## 2025-03-10 RX ORDER — UMECLIDINIUM 62.5 UG/1
62.5 AEROSOL, POWDER ORAL DAILY
Qty: 1 | Refills: 3 | Status: ACTIVE | COMMUNITY
Start: 2025-03-10 | End: 1900-01-01

## 2025-03-25 ENCOUNTER — APPOINTMENT (OUTPATIENT)
Dept: INTERNAL MEDICINE | Facility: CLINIC | Age: 65
End: 2025-03-25
Payer: MEDICARE

## 2025-03-25 VITALS
DIASTOLIC BLOOD PRESSURE: 70 MMHG | HEIGHT: 61 IN | BODY MASS INDEX: 19.26 KG/M2 | SYSTOLIC BLOOD PRESSURE: 99 MMHG | HEART RATE: 97 BPM | TEMPERATURE: 95.1 F | OXYGEN SATURATION: 95 % | WEIGHT: 102 LBS

## 2025-03-25 DIAGNOSIS — Z71.6 TOBACCO ABUSE COUNSELING: ICD-10-CM

## 2025-03-25 PROCEDURE — G2211 COMPLEX E/M VISIT ADD ON: CPT

## 2025-03-25 PROCEDURE — 99213 OFFICE O/P EST LOW 20 MIN: CPT | Mod: GC

## 2025-03-25 RX ORDER — VARENICLINE TARTRATE 1 MG/561
1 TABLET, FILM COATED ORAL TWICE DAILY
Qty: 2 | Refills: 0 | Status: ACTIVE | COMMUNITY
Start: 2025-03-25 | End: 1900-01-01

## 2025-03-30 PROBLEM — F17.210 CIGARETTE SMOKER: Status: ACTIVE | Noted: 2025-03-30

## 2025-04-04 ENCOUNTER — APPOINTMENT (OUTPATIENT)
Dept: CT IMAGING | Facility: HOSPITAL | Age: 65
End: 2025-04-04

## 2025-04-04 ENCOUNTER — OUTPATIENT (OUTPATIENT)
Dept: OUTPATIENT SERVICES | Facility: HOSPITAL | Age: 65
LOS: 1 days | End: 2025-04-04
Payer: MEDICARE

## 2025-04-04 PROCEDURE — 71271 CT THORAX LUNG CANCER SCR C-: CPT | Mod: 26

## 2025-04-04 PROCEDURE — 71271 CT THORAX LUNG CANCER SCR C-: CPT

## 2025-04-07 ENCOUNTER — NON-APPOINTMENT (OUTPATIENT)
Age: 65
End: 2025-04-07

## 2025-04-08 ENCOUNTER — APPOINTMENT (OUTPATIENT)
Dept: INFUSION THERAPY | Facility: CLINIC | Age: 65
End: 2025-04-08

## 2025-04-08 ENCOUNTER — OUTPATIENT (OUTPATIENT)
Dept: OUTPATIENT SERVICES | Facility: HOSPITAL | Age: 65
LOS: 1 days | End: 2025-04-08
Payer: MEDICARE

## 2025-04-08 VITALS
HEART RATE: 94 BPM | HEIGHT: 61 IN | OXYGEN SATURATION: 97 % | TEMPERATURE: 98 F | WEIGHT: 100.97 LBS | RESPIRATION RATE: 16 BRPM | DIASTOLIC BLOOD PRESSURE: 79 MMHG | SYSTOLIC BLOOD PRESSURE: 109 MMHG

## 2025-04-08 DIAGNOSIS — M81.0 AGE-RELATED OSTEOPOROSIS WITHOUT CURRENT PATHOLOGICAL FRACTURE: ICD-10-CM

## 2025-04-08 PROCEDURE — 96372 THER/PROPH/DIAG INJ SC/IM: CPT

## 2025-04-08 RX ORDER — DENOSUMAB 60 MG/ML
60 INJECTION SUBCUTANEOUS ONCE
Refills: 0 | Status: COMPLETED | OUTPATIENT
Start: 2025-04-08 | End: 2025-04-08

## 2025-04-08 RX ADMIN — DENOSUMAB 60 MILLIGRAM(S): 60 INJECTION SUBCUTANEOUS at 13:15

## 2025-05-22 ENCOUNTER — APPOINTMENT (OUTPATIENT)
Dept: DERMATOLOGY | Facility: CLINIC | Age: 65
End: 2025-05-22
Payer: MEDICAID

## 2025-05-22 DIAGNOSIS — L57.9 SKIN CHANGES DUE TO CHRONIC EXPOSURE TO NONIONIZING RADIATION, UNSPECIFIED: ICD-10-CM

## 2025-05-22 DIAGNOSIS — L72.0 EPIDERMAL CYST: ICD-10-CM

## 2025-05-22 DIAGNOSIS — L81.4 OTHER MELANIN HYPERPIGMENTATION: ICD-10-CM

## 2025-05-22 DIAGNOSIS — D22.9 MELANOCYTIC NEVI, UNSPECIFIED: ICD-10-CM

## 2025-05-22 DIAGNOSIS — L82.1 OTHER SEBORRHEIC KERATOSIS: ICD-10-CM

## 2025-05-22 PROCEDURE — 99203 OFFICE O/P NEW LOW 30 MIN: CPT

## 2025-06-09 ENCOUNTER — NON-APPOINTMENT (OUTPATIENT)
Age: 65
End: 2025-06-09

## 2025-06-10 ENCOUNTER — TRANSCRIPTION ENCOUNTER (OUTPATIENT)
Age: 65
End: 2025-06-10

## 2025-06-12 ENCOUNTER — APPOINTMENT (OUTPATIENT)
Dept: INTERNAL MEDICINE | Facility: CLINIC | Age: 65
End: 2025-06-12
Payer: MEDICARE

## 2025-06-12 VITALS
SYSTOLIC BLOOD PRESSURE: 121 MMHG | DIASTOLIC BLOOD PRESSURE: 88 MMHG | OXYGEN SATURATION: 98 % | BODY MASS INDEX: 18.88 KG/M2 | HEART RATE: 99 BPM | TEMPERATURE: 97 F | HEIGHT: 61 IN | WEIGHT: 100 LBS

## 2025-06-12 PROCEDURE — 99214 OFFICE O/P EST MOD 30 MIN: CPT

## 2025-06-12 PROCEDURE — G2211 COMPLEX E/M VISIT ADD ON: CPT

## 2025-08-05 ENCOUNTER — APPOINTMENT (OUTPATIENT)
Dept: ENDOCRINOLOGY | Facility: CLINIC | Age: 65
End: 2025-08-05